# Patient Record
Sex: MALE | Race: WHITE | NOT HISPANIC OR LATINO
[De-identification: names, ages, dates, MRNs, and addresses within clinical notes are randomized per-mention and may not be internally consistent; named-entity substitution may affect disease eponyms.]

---

## 2020-08-03 ENCOUNTER — TRANSCRIPTION ENCOUNTER (OUTPATIENT)
Age: 30
End: 2020-08-03

## 2020-12-24 ENCOUNTER — TRANSCRIPTION ENCOUNTER (OUTPATIENT)
Age: 30
End: 2020-12-24

## 2021-02-06 ENCOUNTER — TRANSCRIPTION ENCOUNTER (OUTPATIENT)
Age: 31
End: 2021-02-06

## 2022-05-12 ENCOUNTER — NON-APPOINTMENT (OUTPATIENT)
Age: 32
End: 2022-05-12

## 2022-05-26 PROBLEM — Z00.00 ENCOUNTER FOR PREVENTIVE HEALTH EXAMINATION: Status: ACTIVE | Noted: 2022-05-26

## 2022-05-31 ENCOUNTER — NON-APPOINTMENT (OUTPATIENT)
Age: 32
End: 2022-05-31

## 2022-05-31 ENCOUNTER — APPOINTMENT (OUTPATIENT)
Dept: ORTHOPEDIC SURGERY | Facility: CLINIC | Age: 32
End: 2022-05-31
Payer: MEDICAID

## 2022-05-31 PROCEDURE — 99204 OFFICE O/P NEW MOD 45 MIN: CPT
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
## 2022-05-31 NOTE — ADDENDUM
[FreeTextEntry1] : Documented by Reinaldo Aguirre acting as a scribe for Dr. Soto and Andrew Gill PA-C on 05/31/2022. \par \par All medical record entries made by the Scribe were at my, Dr. Soto, and Andrew Gill's, direction and\par personally dictated by me on 05/31/2022. I have reviewed the chart and agree that the record\par accurately reflects my personal performance of the history, physical exam, procedure and imaging.

## 2022-05-31 NOTE — PHYSICAL EXAM
[de-identified] : Physical Exam:\par General: Well appearing, no acute distress, A&O\par Neurologic: A&Ox3, No focal deficits\par Head: NCAT without abrasions, lacerations, or ecchymosis to head, face, or scalp\par Eyes: No scleral icterus, no gross abnormalities\par Respiratory: Equal chest wall expansion bilaterally, no accessory muscle use\par Lymphatic: No lymphadenopathy palpated\par Skin: Warm and dry\par Psychiatric: Normal mood and affect\par \par C-Spine\par Palpation: Tenderness to paraspinal muscles and trapezius muscle\par ROM: side bending, symmetrical. Pain with extension and flexion of the neck.\par Reflexes: C5-7 [normal]\par \par Right Shoulder\par ·	Inspection/Palpation: no crepitus, no deformities. \par ·	Range of Motion: no crepitus with ROM; Active FF 0- 100 w/ hitching; ER 0- 15; IR to L1  ; Passive FF 0- 135 with no resistance; ER 0- 25\par ·	Strength: forward elevation in scapular plane 5/5, internal rotation 5/5, external rotation 5/5, adduction 5/5 and abduction 5/5 \par ·	Stability: no joint instability on provocative testing\par ·	Tests: Hung test negative, Neer negative, NEG drop arm test, bear hug test negative Napolean sign negative, cross arm adduction positive, lift off sign negative hornblowers sign negative, speeds test negative Yergason's test negative Wheatley's Active Compression test negative whipple test negative, bicep's load II test negative \par \par Left Shoulder\par ·	Inspection/Palpation: no tenderness, swelling or deformities\par ·	Range of Motion: full and painless in all planes, no crepitus\par ·	Strength: forward elevation in scapular plane 5/5, internal rotation 5/5, external rotation 5/5, adduction 5/5 and abduction 5/5\par ·	Stability: no joint instability on provocative testing\par Tests: Hung test negative, Neer sign negative, negative drop arm test secondary to pain, bear hug test negative, Napolean sign negative, cross arm adduction negative, lift off sign positive, hornblowers sign negative, speeds test negative, Yergason's test negative, no bicipital groove tenderness, Wheatley's Active Compression test negative  [de-identified] : The following radiographs were ordered and read by me during this patients visit. I reviewed each radiograph in detail with the patient and discussed the findings as highlighted below. \par \par 4 views of the Right (R) shoulder show Hill-Sachs deformity, no fracture, dislocation or bony lesions. There is no evidence of degenerative change in the glenohumeral and acromioclavicular joints with maintenance of the joint space. Otherwise unremarkable. \par \par \par MRI OF RIGHT SHOULDER - [facility]\par DATE OF EXAM: 05/24/2022\par IMPRESSION:\par 1. Recent anterior shoulder dislocation with a large Hill-Sachs deformity and bone marrow edema at the posterior lateral aspect of the humeral head. There is no separate bony Bankart fragment. There is partial tearing of the anterior inferior labrum and a nondisplaced tear of the superior labrum.\par \par 2. Partial tear of the anterior and inferior joint capsule.\par \par 3. No rotator cuff tear.
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
## 2022-06-01 ENCOUNTER — APPOINTMENT (OUTPATIENT)
Dept: ORTHOPEDIC SURGERY | Facility: CLINIC | Age: 32
End: 2022-06-01

## 2022-06-28 ENCOUNTER — APPOINTMENT (OUTPATIENT)
Dept: ORTHOPEDIC SURGERY | Facility: CLINIC | Age: 32
End: 2022-06-28
Payer: MEDICAID

## 2022-06-28 PROCEDURE — 99214 OFFICE O/P EST MOD 30 MIN: CPT

## 2022-06-29 NOTE — HISTORY OF PRESENT ILLNESS
[Improving] : improving [1] : an average pain level of 1/10 [0] : a minimum pain level of 0/10 [2] : a maximum pain level of 2/10 [Lifting] : worsened by lifting [Exercise Regimen] : relieved by exercise regimen [NSAIDs] : relieved by nonsteroidal anti-inflammatory drugs [Physical Therapy] : relieved by physical therapy [___ mths] : [unfilled] month(s) ago [de-identified] : CHARITO HU is a 31 year y/o male who presents for f/u visit of Right (R) shoulder pain, RHD. He is doing PT at this time along with HEP at [facility]. He reports improvement in ROM and strength at this time. He still believes lacking FROM. He is working part time at this time, he is event .  [Rest] : not relieved with rest

## 2022-06-29 NOTE — PHYSICAL EXAM
[de-identified] : Physical Exam:\par General: Well appearing, no acute distress, A&O\par Neurologic: A&Ox3, No focal deficits\par Head: NCAT without abrasions, lacerations, or ecchymosis to head, face, or scalp\par Eyes: No scleral icterus, no gross abnormalities\par Respiratory: Equal chest wall expansion bilaterally, no accessory muscle use\par Lymphatic: No lymphadenopathy palpated\par Skin: Warm and dry\par Psychiatric: Normal mood and affect\par \par C-Spine\par Palpation: Tenderness to paraspinal muscles and trapezius muscle\par ROM: side bending, symmetrical. Pain with extension and flexion of the neck.\par Reflexes: C5-7 [normal]\par \par Right Shoulder\par ·	Inspection/Palpation: no crepitus, no deformities. \par ·	Range of Motion: no crepitus with ROM; Active FF 0- 145 w/ hitching; ER 0- 45; IR to L1  ; Passive FF 0- 165 with no resistance; ER 0- 55\par ·	Strength: forward elevation in scapular plane 5/5, internal rotation 5/5, external rotation 5/5, adduction 5/5 and abduction 5/5 \par ·	Stability: no joint instability on provocative testing\par ·	Tests: Hung test negative, Neer negative, NEG drop arm test, bear hug test negative Napolean sign negative, cross arm adduction positive, lift off sign negative hornblowers sign negative, speeds test negative Yergason's test negative Wheatley's Active Compression test negative whipple test negative, bicep's load II test negative \par \par Left Shoulder\par ·	Inspection/Palpation: no tenderness, swelling or deformities\par ·	Range of Motion: full and painless in all planes, no crepitus\par ·	Strength: forward elevation in scapular plane 5/5, internal rotation 5/5, external rotation 5/5, adduction 5/5 and abduction 5/5\par ·	Stability: no joint instability on provocative testing\par Tests: Hung test negative, Neer sign negative, negative drop arm test secondary to pain, bear hug test negative, Napolean sign negative, cross arm adduction negative, lift off sign positive, hornblowers sign negative, speeds test negative, Yergason's test negative, no bicipital groove tenderness, Wheatley's Active Compression test negative  [de-identified] : MRI OF RIGHT SHOULDER - [facility]\par DATE OF EXAM: 05/24/2022\par IMPRESSION:\par 1. Recent anterior shoulder dislocation with a large Hill-Sachs deformity and bone marrow edema at the posterior lateral aspect of the humeral head. There is no separate bony Bankart fragment. There is partial tearing of the anterior inferior labrum and a nondisplaced tear of the superior labrum.\par \par 2. Partial tear of the anterior and inferior joint capsule.\par \par 3. No rotator cuff tear.

## 2022-07-13 ENCOUNTER — APPOINTMENT (OUTPATIENT)
Dept: ORTHOPEDIC SURGERY | Facility: CLINIC | Age: 32
End: 2022-07-13

## 2022-08-09 ENCOUNTER — APPOINTMENT (OUTPATIENT)
Dept: ORTHOPEDIC SURGERY | Facility: CLINIC | Age: 32
End: 2022-08-09

## 2022-08-09 PROCEDURE — 99213 OFFICE O/P EST LOW 20 MIN: CPT

## 2022-08-10 NOTE — DISCUSSION/SUMMARY
[de-identified] : CHARITO HU is a 31 year y/o male who presents for f/u visit of Right (R) shoulder approximately 4 months status post initial injury. Pt reports symptoms have improved significantly since last visit. He reports compliance with PT as well as HEP. He complains of occasional soreness and stiffness of the shoulder that is alleviated with exercise and heating pad. \par \par At this point, patient ROM has greatly improved, and his pain has subsided, he is doing well and happy with his progress so far. He was recommended to continue PT as well as HEP. Patient was given prescription of formal physical therapy that he will perform 2x/wk for 6-8 wks. He was recommended to use heating pad B.I.D daily for stiffness of the shoulder. Patient will follow up with me in 2 months for repeat clinical assessment. All questions were answered and the patient verbalized understanding. The patient is in agreement with this treatment plan.

## 2022-08-10 NOTE — HISTORY OF PRESENT ILLNESS
[Improving] : improving [1] : a maximum pain level of 1/10 [Exercise Regimen] : relieved by exercise regimen [0] : a current pain level of 0/10 [de-identified] : CHARITO HU is a 31 year y/o male who presents for f/u visit of Right (R) shoulder approximately 4 months status post initial injury. Pt reports symptoms have improved significantly since last visit. He reports compliance with PT as well as HEP. He complains of occasional soreness and stiffness of the shoulder that is alleviated with exercise and heating pad.

## 2022-08-10 NOTE — ADDENDUM
[FreeTextEntry1] : Documented by Latoya Winters acting as a scribe for Dr. Soto and Andrew Gill PA-C on 08/09/2022. \par \par All medical record entries made by the Scribe were at my, Dr. Soto, and Andrew Gill's, direction and\par personally dictated by me on 08/09/2022. I have reviewed the chart and agree that the record\par accurately reflects my personal performance of the history, physical exam, procedure and imaging.

## 2022-08-10 NOTE — PHYSICAL EXAM
[de-identified] : Physical Exam:\par General: Well appearing, no acute distress, A&O\par Neurologic: A&Ox3, No focal deficits\par Head: NCAT without abrasions, lacerations, or ecchymosis to head, face, or scalp\par Eyes: No scleral icterus, no gross abnormalities\par Respiratory: Equal chest wall expansion bilaterally, no accessory muscle use\par Lymphatic: No lymphadenopathy palpated\par Skin: Warm and dry\par Psychiatric: Normal mood and affect\par \par C-Spine\par Palpation: Tenderness to paraspinal muscles and trapezius muscle\par ROM: side bending, symmetrical. Pain with extension and flexion of the neck.\par Reflexes: C5-7 [normal]\par \par Right Shoulder\par ·	Inspection/Palpation: no crepitus, no deformities. \par ·	Range of Motion: no crepitus with ROM; Active FF 0- 165 w/ hitching; ER 0- 75; IR to L1 ; Passive FF 0- 160 with no resistance\par ·	Strength: forward elevation in scapular plane 5/5, internal rotation 5/5, external rotation 5/5, adduction 5/5 and abduction 5/5 \par ·	Stability: no joint instability on provocative testing\par ·	Tests: Uhng test negative, Neer negative, NEG drop arm test, bear hug test negative Napolean sign negative, cross arm adduction positive, lift off sign negative hornblowers sign negative, speeds test negative Yergason's test negative Wheatley's Active Compression test negative whipple test negative, bicep's load II test negative \par \par Left Shoulder\par ·	Inspection/Palpation: no tenderness, swelling or deformities\par ·	Range of Motion: full and painless in all planes, no crepitus\par ·	Strength: forward elevation in scapular plane 5/5, internal rotation 5/5, external rotation 5/5, adduction 5/5 and abduction 5/5\par ·	Stability: no joint instability on provocative testing\par Tests: Hung test negative, Neer sign negative, negative drop arm test secondary to pain, bear hug test negative, Napolean sign negative, cross arm adduction negative, lift off sign positive, hornblowers sign negative, speeds test negative, Yergason's test negative, no bicipital groove tenderness, Wheatley's Active Compression test negative  [de-identified] : MRI OF RIGHT SHOULDER - [facility]\par DATE OF EXAM: 05/24/2022\par IMPRESSION:\par 1. Recent anterior shoulder dislocation with a large Hill-Sachs deformity and bone marrow edema at the posterior lateral aspect of the humeral head. There is no separate bony Bankart fragment. There is partial tearing of the anterior inferior labrum and a nondisplaced tear of the superior labrum.\par \par 2. Partial tear of the anterior and inferior joint capsule.\par \par 3. No rotator cuff tear.

## 2022-10-11 ENCOUNTER — APPOINTMENT (OUTPATIENT)
Dept: ORTHOPEDIC SURGERY | Facility: CLINIC | Age: 32
End: 2022-10-11

## 2022-10-11 DIAGNOSIS — M25.611 STIFFNESS OF RIGHT SHOULDER, NOT ELSEWHERE CLASSIFIED: ICD-10-CM

## 2022-10-11 PROCEDURE — 99214 OFFICE O/P EST MOD 30 MIN: CPT

## 2022-10-11 NOTE — ADDENDUM
[FreeTextEntry1] : Documented by Noni Higuera acting as a scribe for Dr. Soto on 10/11/2022.\par \par All medical record entries made by the Scribe were at my, Dr. Soto, direction and\par personally dictated by me on 10/11/2022. I have reviewed the chart and agree that the record\par accurately reflects my personal performance of the history, physical exam, procedure and imaging.

## 2022-10-11 NOTE — DISCUSSION/SUMMARY
[de-identified] : CHARITO HU is a 31 year y/o male who presents for f/u visit of Right (R) shoulder approximately 4 months status post initial injury. t reports symptoms have improved significantly since last visit. He reports completing PT and continue his HEP consistently. He complains of occasional soreness and stiffness of the shoulder that is alleviated with exercise and heating pad.\par \par At this point, patient's ROM and strength has greatly improved, and his pain has subside. He is doing well and happy with his progress so far. He was recommended to continue his HEP. We discussed an eventual transition to more regular training, which would incorporate some of the exercises and stretches he learned in formal physical therapy. 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 He may follow up as needed. All questions were answered and the patient verbalized understanding. The patient is in agreement with this treatment plan.

## 2022-10-11 NOTE — HISTORY OF PRESENT ILLNESS
[de-identified] : CHARITO HU is a 31 year y/o male who presents for f/u visit of Right (R) shoulder approximately 6 months status post initial injury. Pt reports symptoms have improved significantly since last visit. He reports completing PT and continue his HEP consistently. He complains of occasional soreness and stiffness of the shoulder that is alleviated with exercise and heating pad.

## 2022-10-11 NOTE — PHYSICAL EXAM
[de-identified] : General: Well appearing, no acute distress\par Neurologic: A&Ox3, No focal deficits\par Head: NCAT without abrasions, lacerations, or ecchymosis to head, face, or scalp\par Eyes: No scleral icterus, no gross abnormalities\par Respiratory: Equal chest wall expansion bilaterally, no accessory muscle use\par Lymphatic: No lymphadenopathy palpated\par Skin: Warm and dry\par Psychiatric: Normal mood and affect\par \par Right Shoulder\par ·	Inspection/Palpation: no crepitus, no deformities. \par ·	Range of Motion: no crepitus with ROM; Active FF 0- 165 ; ER 0- 45; IR to L1 ; Passive FF 0- 160 with no resistance\par ·	Strength: forward elevation in scapular plane 5/5, internal rotation 5/5, external rotation 5/5, adduction 5/5 and abduction 5/5 \par ·	Stability: no joint instability on provocative testing\par ·	Tests: Hung test negative, Neer negative, NEG drop arm test, bear hug test negative Napolean sign negative, cross arm adduction positive, lift off sign negative hornblowers sign negative, speeds test negative Yergason's test negative Wheatley's Active Compression test negative whipple test negative, bicep's load II test negative

## 2022-10-25 ENCOUNTER — NON-APPOINTMENT (OUTPATIENT)
Age: 32
End: 2022-10-25

## 2022-12-13 ENCOUNTER — APPOINTMENT (OUTPATIENT)
Dept: ORTHOPEDIC SURGERY | Facility: CLINIC | Age: 32
End: 2022-12-13

## 2023-03-23 ENCOUNTER — NON-APPOINTMENT (OUTPATIENT)
Age: 33
End: 2023-03-23

## 2024-03-14 NOTE — DISCUSSION/SUMMARY
Continue current management follow-up with any changes immediately  
Controlled continue current management  Monitor BP at home bring log to next visit    
Keep follow-up with specialist as directed as directed  
Last hemoglobin A1c was 5.9  Not currently on any medication other than metformin  Watching carb and sugar intake closely      
Monitoring kidney function with labs  Continue current management  
Need repeat thyroid labs  Not currently taking (  
Recommend continuing atorvastatin  Needs lipid panel with labs   Continue to work on lower cholesterol diet and exercise.  Goal is greater than 150 minutes moderate intensity weekly     
[de-identified] : CHARITO HU is a 31 year y/o male who presents for f/u visit of Right (R) shoulder pain, RHD. He is doing PT at this time along with HEP at [facility]. He reports improvement in ROM and strength at this time. He still believes lacking FROM. He is working part time at this time, he is event . \par \par At this point, patient ROM has improved, and his pain has subsided, he is doing well and happy with his progress so far. On clinical exam, he is still lacking FROM 2.5 months following injury. I discussed possibility of cortisone injection that might help him achieve FROM. Patient deferred it at this time. He will continue PT and HEP at this time. Patient will follow up in 4-6 wks for repeat clinical assessment. If refractory then we will consider cortisone injection. All questions were answered and the patient verbalized understanding. The patient is in agreement with this treatment plan. \par \par

## 2024-09-20 ENCOUNTER — NON-APPOINTMENT (OUTPATIENT)
Age: 34
End: 2024-09-20